# Patient Record
(demographics unavailable — no encounter records)

---

## 2025-06-23 NOTE — HISTORY OF PRESENT ILLNESS
[FreeTextEntry1] : Aaron Carrion is a 39-year-old gentleman with a history of hypertension, dyslipidemia,  KAI, on CPAP, former smoker, who presents today for cardiovascular consultation. The patient was in [place] ED on 6/20/2025 with faintness and feeling off balance. He went to [facility] the day prior for the same complaints. His blood sugar was normal with an A1c of 5.8. He was diagnosed with dehydration. At [place], a CT angiogram of the head on 6/20/2025 showed no stenosis, dissection, or proximal intracranial large vessel occlusion. MRI of the brain showed no acute infarct. Troponins were negative x2, thyroid studies were normal, blood sugar was borderline at 101 with an A1c of 5.7, WBC was elevated to 11.29, with otherwise normal labs. The patient has been under significant stress. He had three episodes of sudden room spinning sensation and wavy vision, and on the third is when he went for medical attention. He has been feeling congestion, and notes he gets frequent sinus infections which he credits to his CPAP. He denies recent chest pain, shortness of breath, palpitations, syncope, orthopnea, or PND. He has been taking his medications as prescribed.

## 2025-06-23 NOTE — ASSESSMENT
[FreeTextEntry1] : 1.  Hypertension.  2.  Dyslipidemia.  3.  Borderline diabetes.  4.  KAI, on CPAP. 5.  Obesity.  6.  Lightheadedness, room spinning sensation. 7.  Visual changes. 8.  Congestion.  9.  Family history of premature CAD.

## 2025-06-23 NOTE — FAMILY HISTORY
[TextEntry] : The patient's mother passed away age 53 in a car accident. She had an MI in her 40s and was obese. The patient's father *** His grandmother received a heart transplant at age 62.

## 2025-06-23 NOTE — REVIEW OF SYSTEMS
[Blurry Vision] : blurred vision [Cough] : cough [Dizziness] : dizziness [Under Stress] : under stress [Fever] : no fever [Headache] : no headache [Weight Gain (___ Lbs)] : no recent weight gain [Chills] : no chills [Feeling Fatigued] : not feeling fatigued [Weight Loss (___ Lbs)] : no recent weight loss [Seeing Double (Diplopia)] : no diplopia [Eye Pain] : no eye pain [Earache] : no earache [Discharge From Ears] : no discharge from the ears [Hearing Loss] : no hearing loss [Mouth Sores] : no mouth sores [Sore Throat] : no sore throat [Sinus Pressure] : no sinus pressure [Tinnitus] : no tinnitus [Vertigo] : no vertigo [SOB] : no shortness of breath [Dyspnea on exertion] : not dyspnea during exertion [Chest Discomfort] : no chest discomfort [Lower Ext Edema] : no extremity edema [Leg Claudication] : no intermittent leg claudication [Palpitations] : no palpitations [Orthopnea] : no orthopnea [PND] : no PND [Syncope] : no syncope [Wheezing] : no wheezing [Coughing Up Blood] : no hemoptysis [Snoring] : no snoring [Abdominal Pain] : no abdominal pain [Nausea] : no nausea [Vomiting] : no vomiting [Heartburn] : no heartburn [Change in Appetite] : no change in appetite [Change In The Stool] : no change in stool [Dysphagia] : no dysphagia [Diarrhea] : diarrhea [Constipation] : no constipation [Blood in stool] : no blood in stoo [Urinary Frequency] : no change in urinary frequency [Hematuria] : no hematuria [Pain During Urination] : no dysuria [Erectile Dysfunction] : no erectile dysfunction [Nocturia] : no nocturia [Joint Pain] : no joint pain [Joint Swelling] : no joint swelling [Joint Stiffness] : no joint stiffness [Muscle Cramps] : no muscle cramps [Myalgia] : no myalgia [Rash] : no rash [Itching] : no itching [Change In Color Of Skin] : change in skin color [Skin Lesions] : no skin lesions [Telangiectasias] : no telangiectasias [Tremor] : no tremor was seen [Numbness (Hypoesthesia)] : no numbness [Convulsions] : no convulsions [Tingling (Paresthesia)] : no tingling [Weakness] : no weakness [Limb Weakness (Paresis)] : no limb weakness (Paresis) [Speech Disturbance] : no speech disturbance [Confusion] : no confusion was observed [Memory Lapses Or Loss] : no memory lapses or loss [Depression] : no depression [Anxiety] : no anxiety [Suicidal] : not suicidal [Easy Bleeding] : no tendency for easy bleeding [Swollen Glands] : no swollen glands [Easy Bruising] : no tendency for easy bruising [FreeTextEntry3] : Wavy vision [FreeTextEntry6] : Congestion [de-identified] : Room spinning

## 2025-06-23 NOTE — DISCUSSION/SUMMARY
[___ Month(s)] : in [unfilled] month(s) [EKG obtained to assist in diagnosis and management of assessed problem(s)] : EKG obtained to assist in diagnosis and management of assessed problem(s) [FreeTextEntry1] : I have sent in a 7-day course of antibiotics for his sinus infection.   The patient's heart rate and blood pressure are well controlled. I have asked him to continue with the remainder of his current medications as prescribed without change.  I have asked the patient to undergo an echocardiogram to assess LV size, wall thickness, systolic function, valvular function, and pulmonary artery systolic pressure.   I have asked the patient to undergo 2-week Holter monitoring to evaluate for heart rhythm disturbances. This will be applied today by the MA.  I have asked the patient to undergo a coronary CTA with IV contrast and calcium scoring to evaluate for coronary artery disease.   I have asked the patient to follow a low salt, low fat, low cholesterol diet. I have asked the patient not to engage in any new exercises or activities until after the cardiac work up is complete.   I have asked that the patient follow up with me in one months' time, or sooner with any change in symptoms.   I, Kenya Duffy, am scribing for and the presence of Dr. Scott Genao, the following sections HISTORY OF PRESENT ILLNESSS; CARDIOLOGY SUMMARY; ACTIVE PROBLEMS; PAST MEDICAL HISTORY; PAST SURGICAL HISTORY; FAMILY HISTORY; SOCIAL HISTORY; REVIEW OF SYSTEMS; PHYSICAL EXAM; ASSESSMENT; PLAN.

## 2025-06-23 NOTE — PAST MEDICAL HISTORY
[TextEntry] : -  Hypertension. -  Borderline diabetes. -  Dyslipidemia.  -  Obesity.  -  KAI.  -  Low back pain.  -  Anxiety.

## 2025-06-23 NOTE — CARDIOLOGY SUMMARY
[de-identified] : From 6/23/2025: Normal sinus rhythm with a rate of 75 bpm, normal axis, normal IA interval 152 ms, normal QRS duration 89 ms, with incomplete RBBB, normal QT interval 372 ms, with poor R wave progression.

## 2025-06-23 NOTE — SOCIAL HISTORY
[TextEntry] : The patient is  and living with his wife. They have three children, one daughter and two sons. He quit smoking at age 26/27. Prior to this he smoked 1/2-1 pack daily from the age of 14. He has 10-12 alcoholic drinks monthly. He denies any drug use or vaping. He is a  in the [employer].